# Patient Record
Sex: FEMALE
[De-identification: names, ages, dates, MRNs, and addresses within clinical notes are randomized per-mention and may not be internally consistent; named-entity substitution may affect disease eponyms.]

---

## 2020-05-27 PROBLEM — Z00.00 ENCOUNTER FOR PREVENTIVE HEALTH EXAMINATION: Status: ACTIVE | Noted: 2020-05-27

## 2020-05-28 ENCOUNTER — APPOINTMENT (OUTPATIENT)
Dept: NEUROLOGY | Facility: CLINIC | Age: 70
End: 2020-05-28
Payer: MEDICARE

## 2020-05-28 DIAGNOSIS — R56.9 UNSPECIFIED CONVULSIONS: ICD-10-CM

## 2020-05-28 PROCEDURE — 99205 OFFICE O/P NEW HI 60 MIN: CPT | Mod: 95

## 2020-05-28 NOTE — DISCUSSION/SUMMARY
[FreeTextEntry1] : Seizure  \par \par The   patient   will  need    an  MRI    and    EEG\par No  AEDs     for    now

## 2020-05-28 NOTE — HISTORY OF PRESENT ILLNESS
[FreeTextEntry1] : The  patient   is  a  marginal   historian.  History   was   obtained    from the   PT,  [facility]   manager  and   caregivers.   The   Pt    has  a   h/o   Autism,  ADHD, intellectual    disability and    remote    h/o  seizures.   Pt  went  to ER    on May 25 after    the    witnessed    episode   of   shaking.   Based  on the    medical   staff    observation   the   patient   became    unresponsive, while    sitting,   developed    shaking  and  tremors,  sled   down    the    couch and   fell.   She    was  taken   to  the    hospital ([facility]).   The    records   are     not  available. Reportedly     the    patient    had    CT    of  the   head and   Cervical    spine,  results    are    unknown.    No   anticonvulsants   were    prescribed.  The    patient    was    taking    Tegretol    in the    remote    past,  it   was   d/ed in  1990. Reportedly    the   patient    has been    seizure    free   for  at least    over   20    years. \par \par Pt    was    Dx   COVID  +   in  March

## 2020-05-28 NOTE — PHYSICAL EXAM
[General Appearance - Alert] : alert [FreeTextEntry1] : intellectual   disability [General Appearance - In No Acute Distress] : in no acute distress

## 2020-05-28 NOTE — REASON FOR VISIT
[Other Location: e.g. Home (Enter Location, City,State)___] : at [unfilled] [Verbal consent obtained from patient] : the patient, [unfilled] [Home] : at home, [unfilled] , at the time of the visit. [Initial Evaluation] : an initial evaluation